# Patient Record
Sex: MALE | Race: WHITE | Employment: FULL TIME | URBAN - NONMETROPOLITAN AREA
[De-identification: names, ages, dates, MRNs, and addresses within clinical notes are randomized per-mention and may not be internally consistent; named-entity substitution may affect disease eponyms.]

---

## 2017-05-03 ENCOUNTER — OFFICE VISIT (OUTPATIENT)
Dept: PRIMARY CARE CLINIC | Age: 42
End: 2017-05-03
Payer: COMMERCIAL

## 2017-05-03 VITALS
WEIGHT: 253.4 LBS | HEART RATE: 69 BPM | DIASTOLIC BLOOD PRESSURE: 87 MMHG | SYSTOLIC BLOOD PRESSURE: 122 MMHG | RESPIRATION RATE: 16 BRPM | TEMPERATURE: 97.1 F | HEIGHT: 70 IN | BODY MASS INDEX: 36.28 KG/M2

## 2017-05-03 DIAGNOSIS — Z23 NEED FOR TETANUS BOOSTER: ICD-10-CM

## 2017-05-03 DIAGNOSIS — R35.0 URINARY FREQUENCY: Primary | ICD-10-CM

## 2017-05-03 DIAGNOSIS — N52.9 ERECTILE DYSFUNCTION, UNSPECIFIED ERECTILE DYSFUNCTION TYPE: ICD-10-CM

## 2017-05-03 LAB
BILIRUBIN, POC: NORMAL
BLOOD URINE, POC: NORMAL
CLARITY, POC: CLEAR
COLOR, POC: YELLOW
GLUCOSE URINE, POC: NORMAL
KETONES, POC: NORMAL
LEUKOCYTE EST, POC: NORMAL
NITRITE, POC: NORMAL
PH, POC: 5
PROTEIN, POC: NORMAL
SPECIFIC GRAVITY, POC: 1020
UROBILINOGEN, POC: 0.2

## 2017-05-03 PROCEDURE — 81002 URINALYSIS NONAUTO W/O SCOPE: CPT | Performed by: FAMILY MEDICINE

## 2017-05-03 PROCEDURE — 99213 OFFICE O/P EST LOW 20 MIN: CPT | Performed by: FAMILY MEDICINE

## 2017-05-03 RX ORDER — DICLOFENAC SODIUM 75 MG/1
75 TABLET, DELAYED RELEASE ORAL 2 TIMES DAILY
COMMUNITY
End: 2018-05-29 | Stop reason: SDUPTHER

## 2017-05-05 ENCOUNTER — NURSE ONLY (OUTPATIENT)
Dept: PRIMARY CARE CLINIC | Age: 42
End: 2017-05-05
Payer: COMMERCIAL

## 2017-05-05 DIAGNOSIS — N52.9 ERECTILE DYSFUNCTION, UNSPECIFIED ERECTILE DYSFUNCTION TYPE: ICD-10-CM

## 2017-05-05 DIAGNOSIS — Z00.00 ENCOUNTER FOR WELLNESS EXAMINATION: Primary | ICD-10-CM

## 2017-05-05 PROCEDURE — 36415 COLL VENOUS BLD VENIPUNCTURE: CPT | Performed by: FAMILY MEDICINE

## 2017-05-06 ASSESSMENT — ENCOUNTER SYMPTOMS
RESPIRATORY NEGATIVE: 1
GASTROINTESTINAL NEGATIVE: 1

## 2017-05-10 ENCOUNTER — TELEPHONE (OUTPATIENT)
Dept: PRIMARY CARE CLINIC | Age: 42
End: 2017-05-10

## 2017-05-11 ENCOUNTER — TELEPHONE (OUTPATIENT)
Dept: PRIMARY CARE CLINIC | Age: 42
End: 2017-05-11

## 2017-05-11 DIAGNOSIS — E34.9 HYPOTESTOSTERONEMIA: Primary | ICD-10-CM

## 2017-05-12 ENCOUNTER — OFFICE VISIT (OUTPATIENT)
Dept: UROLOGY | Facility: CLINIC | Age: 42
End: 2017-05-12

## 2017-05-12 VITALS
BODY MASS INDEX: 36.25 KG/M2 | HEIGHT: 70 IN | TEMPERATURE: 97.5 F | SYSTOLIC BLOOD PRESSURE: 118 MMHG | WEIGHT: 253.2 LBS | DIASTOLIC BLOOD PRESSURE: 68 MMHG

## 2017-05-12 DIAGNOSIS — R35.0 FREQUENCY OF MICTURITION: ICD-10-CM

## 2017-05-12 DIAGNOSIS — R39.15 URGENCY OF URINATION: ICD-10-CM

## 2017-05-12 DIAGNOSIS — E29.1 HYPOGONADISM IN MALE: Primary | ICD-10-CM

## 2017-05-12 LAB
BILIRUB BLD-MCNC: NEGATIVE MG/DL
CLARITY, POC: CLEAR
COLOR UR: YELLOW
GLUCOSE UR STRIP-MCNC: NEGATIVE MG/DL
KETONES UR QL: NEGATIVE
LEUKOCYTE EST, POC: NEGATIVE
NITRITE UR-MCNC: NEGATIVE MG/ML
PH UR: 7 [PH] (ref 5–8)
PROT UR STRIP-MCNC: NEGATIVE MG/DL
RBC # UR STRIP: NEGATIVE /UL
SP GR UR: 1.02 (ref 1–1.03)
UROBILINOGEN UR QL: NORMAL

## 2017-05-12 PROCEDURE — 81003 URINALYSIS AUTO W/O SCOPE: CPT | Performed by: UROLOGY

## 2017-05-12 PROCEDURE — 51798 US URINE CAPACITY MEASURE: CPT | Performed by: UROLOGY

## 2017-05-12 PROCEDURE — 99203 OFFICE O/P NEW LOW 30 MIN: CPT | Performed by: UROLOGY

## 2017-05-12 RX ORDER — DICLOFENAC SODIUM 75 MG/1
TABLET, DELAYED RELEASE ORAL
COMMUNITY
Start: 2017-05-09

## 2017-11-17 ENCOUNTER — OFFICE VISIT (OUTPATIENT)
Dept: PRIMARY CARE CLINIC | Age: 42
End: 2017-11-17
Payer: COMMERCIAL

## 2017-11-17 VITALS
DIASTOLIC BLOOD PRESSURE: 86 MMHG | BODY MASS INDEX: 36.39 KG/M2 | RESPIRATION RATE: 16 BRPM | OXYGEN SATURATION: 98 % | TEMPERATURE: 97.7 F | SYSTOLIC BLOOD PRESSURE: 126 MMHG | HEIGHT: 70 IN | WEIGHT: 254.2 LBS | HEART RATE: 68 BPM

## 2017-11-17 DIAGNOSIS — G57.61: Primary | ICD-10-CM

## 2017-11-17 DIAGNOSIS — N52.9 ERECTILE DYSFUNCTION, UNSPECIFIED ERECTILE DYSFUNCTION TYPE: ICD-10-CM

## 2017-11-17 PROCEDURE — 99213 OFFICE O/P EST LOW 20 MIN: CPT | Performed by: FAMILY MEDICINE

## 2017-11-17 RX ORDER — PREDNISONE 10 MG/1
10 TABLET ORAL DAILY
Qty: 10 TABLET | Refills: 0 | Status: SHIPPED | OUTPATIENT
Start: 2017-11-17 | End: 2017-11-27

## 2017-11-17 ASSESSMENT — ENCOUNTER SYMPTOMS: RESPIRATORY NEGATIVE: 1

## 2017-11-17 NOTE — PATIENT INSTRUCTIONS
doctor prescribed special pads or a device to relieve pressure on your toes, use these items as directed. · Until all pain and swelling go away, avoid activities that put pressure on the toes. These include racquet sports and running. When should you call for help? Watch closely for changes in your health, and be sure to contact your doctor if:  · You do not get better as expected. Where can you learn more? Go to [email or web address]://[email or web address].[email or web address].[email or web address]. org and sign in to your [employer] account. Enter E100 in the Motorator box to learn more about \"Esquivel's Neuroma: Care Instructions. \"     If you do not have an account, please click on the \"Sign Up Now\" link. Current as of: March 21, 2017  Content Version: 11.3  © 8182-0189 [employer]. Care instructions adapted under license by [facility] ([facility]). If you have questions about a medical condition or this instruction, always ask your healthcare professional. [place] [address] any warranty or liability for your use of this information.

## 2017-11-18 NOTE — PROGRESS NOTES
Subjective:      Patient ID: Marciano Kern is a 46 y.o. male who comes in today complaining of right foot pain. HPI. He has always been very active especially in the fall with rodeo. He is complaining of pain beneath his 3rd and 4th toes on the right foot. He did fracture his great toe and a horse accident several years ago and he had to walk funny on his right foot. He is currently on diclofenac but it doesn't seem to be helping. Sometimes it feels like he is walking on something. He did see the urologist who told him to decrease beer and soft drinks. His urology symptoms are better but he is still suffering from erectile dysfunction. He says his marriage is good. Now he is starting to worry about it. He does not smoke. He has decreased alcohol intake. Review of Systems   Constitutional: Negative. Respiratory: Negative. Cardiovascular: Negative. Musculoskeletal: Positive for arthralgias and gait problem. Skin: Negative. Objective:   Physical Exam   Constitutional: He is oriented to person, place, and time. He appears well-developed and well-nourished. No distress. Neck: Normal range of motion. Neck supple. Cardiovascular: Normal rate, regular rhythm, normal heart sounds and intact distal pulses. Pulmonary/Chest: Effort normal and breath sounds normal.   Musculoskeletal: Normal range of motion. He exhibits tenderness (to palpation under the bottom of the 3rd and 4th toes right foot. He has a callus on top of his 2nd right toe and it looks like he may be developing a hammertoe. ). Neurological: He is alert and oriented to person, place, and time. Skin: Skin is warm and dry. Psychiatric: He has a normal mood and affect. His behavior is normal. Thought content normal.   Vitals reviewed. Assessment:      1. Esquivel metatarsalgia, right    2.  Erectile dysfunction, unspecified erectile dysfunction type            Plan:      MEDICATIONS:  Orders Placed This Encounter Medications    predniSONE (DELTASONE) 10 MG tablet     Sig: Take 1 tablet by mouth daily for 10 days     Dispense:  10 tablet     Refill:  0     Hold diclofenac while on prednisone       ORDERS:  No orders of the defined types were placed in this encounter. He was given samples of Viagra 100 mg to try. He is to take one half tablet and see how this works. We talked about side effects such as chest pain or palpitations or visual changes. If he has any problems he is to let me know. He is not to try the Viagra at the same time that he takes the prednisone but he may stop the diclofenac and try the prednisone to see if this will help what I think is Alvin's metatarsalgia. He will keep his appointment with Dr. Araceli Elliott in [place]        Follow-up:  Return if symptoms worsen or fail to improve. PATIENT INSTRUCTIONS:  Patient Instructions     Patient Education        Sequivel's Neuroma: Care Instructions  Your Care Instructions  When your toes are squeezed together, often over a period of months or even years, the nerve that runs between the toes can swell and get thicker. This is called a Esquivel's neuroma. It may feel like a small lump is pushing inside the ball of your foot. When you walk or move your toes, you feel pain that sometimes moves into your toes. If the pressure continues, it may damage the nerve. If you catch the problem early and change your shoes, the nerve swelling may go away. Your doctor may advise you to wear wide-toed shoes. He or she also may suggest that you ice the sore spot and limit activities that put pressure on the nerve. If these steps do not help your symptoms, your doctor may have you use special pads or devices that spread the toes. This keeps them from squeezing the nerve. In some cases, you may get a cortisone shot to reduce swelling and pain. If these treatments don't help, your doctor may suggest surgery to relieve pressure or remove the swollen nerve.   Follow-up care is

## 2018-05-29 RX ORDER — DICLOFENAC SODIUM 75 MG/1
75 TABLET, DELAYED RELEASE ORAL 2 TIMES DAILY
Qty: 60 TABLET | Refills: 3 | Status: SHIPPED | OUTPATIENT
Start: 2018-05-29